# Patient Record
Sex: MALE
[De-identification: names, ages, dates, MRNs, and addresses within clinical notes are randomized per-mention and may not be internally consistent; named-entity substitution may affect disease eponyms.]

---

## 2017-04-06 ENCOUNTER — APPOINTMENT (OUTPATIENT)
Dept: ORTHOPEDIC SURGERY | Facility: CLINIC | Age: 42
End: 2017-04-06

## 2019-08-22 ENCOUNTER — NON-APPOINTMENT (OUTPATIENT)
Age: 44
End: 2019-08-22

## 2019-08-22 ENCOUNTER — APPOINTMENT (OUTPATIENT)
Dept: OPHTHALMOLOGY | Facility: CLINIC | Age: 44
End: 2019-08-22
Payer: COMMERCIAL

## 2019-08-22 PROCEDURE — 92004 COMPRE OPH EXAM NEW PT 1/>: CPT

## 2019-08-22 PROCEDURE — 92015 DETERMINE REFRACTIVE STATE: CPT

## 2020-12-02 ENCOUNTER — TRANSCRIPTION ENCOUNTER (OUTPATIENT)
Age: 45
End: 2020-12-02

## 2025-02-27 ENCOUNTER — TRANSCRIPTION ENCOUNTER (OUTPATIENT)
Age: 50
End: 2025-02-27

## 2025-02-27 ENCOUNTER — INPATIENT (INPATIENT)
Facility: HOSPITAL | Age: 50
LOS: 0 days | Discharge: HOME | DRG: 313 | End: 2025-02-28
Attending: INTERNAL MEDICINE | Admitting: STUDENT IN AN ORGANIZED HEALTH CARE EDUCATION/TRAINING PROGRAM
Payer: COMMERCIAL

## 2025-02-27 ENCOUNTER — RESULT REVIEW (OUTPATIENT)
Age: 50
End: 2025-02-27

## 2025-02-27 VITALS
HEIGHT: 64 IN | SYSTOLIC BLOOD PRESSURE: 150 MMHG | TEMPERATURE: 98 F | WEIGHT: 151.02 LBS | OXYGEN SATURATION: 100 % | HEART RATE: 78 BPM | RESPIRATION RATE: 18 BRPM | DIASTOLIC BLOOD PRESSURE: 87 MMHG

## 2025-02-27 DIAGNOSIS — E11.9 TYPE 2 DIABETES MELLITUS WITHOUT COMPLICATIONS: ICD-10-CM

## 2025-02-27 DIAGNOSIS — K21.9 GASTRO-ESOPHAGEAL REFLUX DISEASE WITHOUT ESOPHAGITIS: ICD-10-CM

## 2025-02-27 DIAGNOSIS — Z29.9 ENCOUNTER FOR PROPHYLACTIC MEASURES, UNSPECIFIED: ICD-10-CM

## 2025-02-27 DIAGNOSIS — I10 ESSENTIAL (PRIMARY) HYPERTENSION: ICD-10-CM

## 2025-02-27 DIAGNOSIS — E78.5 HYPERLIPIDEMIA, UNSPECIFIED: ICD-10-CM

## 2025-02-27 DIAGNOSIS — R07.9 CHEST PAIN, UNSPECIFIED: ICD-10-CM

## 2025-02-27 LAB
A1C WITH ESTIMATED AVERAGE GLUCOSE RESULT: 8 % — HIGH (ref 4–5.6)
ADD ON TEST-SPECIMEN IN LAB: SIGNIFICANT CHANGE UP
ALBUMIN SERPL ELPH-MCNC: 4.5 G/DL — SIGNIFICANT CHANGE UP (ref 3.3–5)
ALP SERPL-CCNC: 81 U/L — SIGNIFICANT CHANGE UP (ref 40–120)
ALT FLD-CCNC: 37 U/L — SIGNIFICANT CHANGE UP (ref 10–45)
ANION GAP SERPL CALC-SCNC: 15 MMOL/L — SIGNIFICANT CHANGE UP (ref 5–17)
AST SERPL-CCNC: 29 U/L — SIGNIFICANT CHANGE UP (ref 10–40)
BASOPHILS # BLD AUTO: 0.09 K/UL — SIGNIFICANT CHANGE UP (ref 0–0.2)
BASOPHILS NFR BLD AUTO: 0.8 % — SIGNIFICANT CHANGE UP (ref 0–2)
BILIRUB SERPL-MCNC: 0.5 MG/DL — SIGNIFICANT CHANGE UP (ref 0.2–1.2)
BUN SERPL-MCNC: 24 MG/DL — HIGH (ref 7–23)
CALCIUM SERPL-MCNC: 9.6 MG/DL — SIGNIFICANT CHANGE UP (ref 8.4–10.5)
CHLORIDE SERPL-SCNC: 97 MMOL/L — SIGNIFICANT CHANGE UP (ref 96–108)
CHOLEST SERPL-MCNC: 114 MG/DL — SIGNIFICANT CHANGE UP
CO2 SERPL-SCNC: 25 MMOL/L — SIGNIFICANT CHANGE UP (ref 22–31)
CREAT SERPL-MCNC: 0.89 MG/DL — SIGNIFICANT CHANGE UP (ref 0.5–1.3)
EGFR: 103 ML/MIN/1.73M2 — SIGNIFICANT CHANGE UP
EOSINOPHIL # BLD AUTO: 2.51 K/UL — HIGH (ref 0–0.5)
EOSINOPHIL NFR BLD AUTO: 22.7 % — HIGH (ref 0–6)
ESTIMATED AVERAGE GLUCOSE: 183 MG/DL — HIGH (ref 68–114)
FLUAV AG NPH QL: SIGNIFICANT CHANGE UP
FLUBV AG NPH QL: SIGNIFICANT CHANGE UP
GLUCOSE SERPL-MCNC: 178 MG/DL — HIGH (ref 70–99)
HCT VFR BLD CALC: 46.1 % — SIGNIFICANT CHANGE UP (ref 39–50)
HDLC SERPL-MCNC: 25 MG/DL — LOW
HGB BLD-MCNC: 15.7 G/DL — SIGNIFICANT CHANGE UP (ref 13–17)
LIDOCAIN IGE QN: 25 U/L — SIGNIFICANT CHANGE UP (ref 7–60)
LIPID PNL WITH DIRECT LDL SERPL: 68 MG/DL — SIGNIFICANT CHANGE UP
LYMPHOCYTES # BLD AUTO: 1.58 K/UL — SIGNIFICANT CHANGE UP (ref 1–3.3)
LYMPHOCYTES # BLD AUTO: 14.3 % — SIGNIFICANT CHANGE UP (ref 13–44)
MAGNESIUM SERPL-MCNC: 1.9 MG/DL — SIGNIFICANT CHANGE UP (ref 1.6–2.6)
MANUAL SMEAR VERIFICATION: SIGNIFICANT CHANGE UP
MCHC RBC-ENTMCNC: 29.7 PG — SIGNIFICANT CHANGE UP (ref 27–34)
MCHC RBC-ENTMCNC: 34.1 G/DL — SIGNIFICANT CHANGE UP (ref 32–36)
MCV RBC AUTO: 87.3 FL — SIGNIFICANT CHANGE UP (ref 80–100)
MONOCYTES # BLD AUTO: 0.38 K/UL — SIGNIFICANT CHANGE UP (ref 0–0.9)
MONOCYTES NFR BLD AUTO: 3.4 % — SIGNIFICANT CHANGE UP (ref 2–14)
NEUTROPHILS # BLD AUTO: 6.5 K/UL — SIGNIFICANT CHANGE UP (ref 1.8–7.4)
NEUTROPHILS NFR BLD AUTO: 58.8 % — SIGNIFICANT CHANGE UP (ref 43–77)
NON HDL CHOLESTEROL: 89 MG/DL — SIGNIFICANT CHANGE UP
PHOSPHATE SERPL-MCNC: 3 MG/DL — SIGNIFICANT CHANGE UP (ref 2.5–4.5)
PLAT MORPH BLD: NORMAL — SIGNIFICANT CHANGE UP
PLATELET # BLD AUTO: 227 K/UL — SIGNIFICANT CHANGE UP (ref 150–400)
POTASSIUM SERPL-MCNC: 3.8 MMOL/L — SIGNIFICANT CHANGE UP (ref 3.5–5.3)
POTASSIUM SERPL-SCNC: 3.8 MMOL/L — SIGNIFICANT CHANGE UP (ref 3.5–5.3)
PROT SERPL-MCNC: 7.7 G/DL — SIGNIFICANT CHANGE UP (ref 6–8.3)
RBC # BLD: 5.28 M/UL — SIGNIFICANT CHANGE UP (ref 4.2–5.8)
RBC # FLD: 11.3 % — SIGNIFICANT CHANGE UP (ref 10.3–14.5)
RBC BLD AUTO: SIGNIFICANT CHANGE UP
RSV RNA NPH QL NAA+NON-PROBE: SIGNIFICANT CHANGE UP
SARS-COV-2 RNA SPEC QL NAA+PROBE: SIGNIFICANT CHANGE UP
SODIUM SERPL-SCNC: 137 MMOL/L — SIGNIFICANT CHANGE UP (ref 135–145)
TRIGL SERPL-MCNC: 112 MG/DL — SIGNIFICANT CHANGE UP
TROPONIN T, HIGH SENSITIVITY RESULT: 7 NG/L — SIGNIFICANT CHANGE UP (ref 0–51)
TROPONIN T, HIGH SENSITIVITY RESULT: <6 NG/L — SIGNIFICANT CHANGE UP (ref 0–51)
WBC # BLD: 11.06 K/UL — HIGH (ref 3.8–10.5)
WBC # FLD AUTO: 11.06 K/UL — HIGH (ref 3.8–10.5)

## 2025-02-27 PROCEDURE — 99223 1ST HOSP IP/OBS HIGH 75: CPT | Mod: GC

## 2025-02-27 PROCEDURE — 99285 EMERGENCY DEPT VISIT HI MDM: CPT

## 2025-02-27 PROCEDURE — 71045 X-RAY EXAM CHEST 1 VIEW: CPT | Mod: 26

## 2025-02-27 PROCEDURE — 99223 1ST HOSP IP/OBS HIGH 75: CPT

## 2025-02-27 PROCEDURE — 93308 TTE F-UP OR LMTD: CPT | Mod: 26

## 2025-02-27 PROCEDURE — 93356 MYOCRD STRAIN IMG SPCKL TRCK: CPT

## 2025-02-27 PROCEDURE — 93306 TTE W/DOPPLER COMPLETE: CPT | Mod: 26

## 2025-02-27 RX ORDER — DEXTROSE 50 % IN WATER 50 %
25 SYRINGE (ML) INTRAVENOUS ONCE
Refills: 0 | Status: DISCONTINUED | OUTPATIENT
Start: 2025-02-27 | End: 2025-02-28

## 2025-02-27 RX ORDER — ATORVASTATIN CALCIUM 80 MG/1
1 TABLET, FILM COATED ORAL
Refills: 0 | DISCHARGE

## 2025-02-27 RX ORDER — GLUCAGON 3 MG/1
1 POWDER NASAL ONCE
Refills: 0 | Status: DISCONTINUED | OUTPATIENT
Start: 2025-02-27 | End: 2025-02-28

## 2025-02-27 RX ORDER — ASPIRIN 325 MG
81 TABLET ORAL DAILY
Refills: 0 | Status: DISCONTINUED | OUTPATIENT
Start: 2025-02-28 | End: 2025-02-28

## 2025-02-27 RX ORDER — MELATONIN 5 MG
3 TABLET ORAL AT BEDTIME
Refills: 0 | Status: DISCONTINUED | OUTPATIENT
Start: 2025-02-27 | End: 2025-02-28

## 2025-02-27 RX ORDER — INSULIN LISPRO 100 U/ML
INJECTION, SOLUTION INTRAVENOUS; SUBCUTANEOUS
Refills: 0 | Status: DISCONTINUED | OUTPATIENT
Start: 2025-02-27 | End: 2025-02-28

## 2025-02-27 RX ORDER — SODIUM CHLORIDE 9 G/1000ML
1000 INJECTION, SOLUTION INTRAVENOUS
Refills: 0 | Status: DISCONTINUED | OUTPATIENT
Start: 2025-02-27 | End: 2025-02-28

## 2025-02-27 RX ORDER — DEXTROSE 50 % IN WATER 50 %
12.5 SYRINGE (ML) INTRAVENOUS ONCE
Refills: 0 | Status: DISCONTINUED | OUTPATIENT
Start: 2025-02-27 | End: 2025-02-28

## 2025-02-27 RX ORDER — METFORMIN HYDROCHLORIDE 850 MG/1
1 TABLET ORAL
Refills: 0 | DISCHARGE

## 2025-02-27 RX ORDER — DEXTROSE 50 % IN WATER 50 %
15 SYRINGE (ML) INTRAVENOUS ONCE
Refills: 0 | Status: DISCONTINUED | OUTPATIENT
Start: 2025-02-27 | End: 2025-02-28

## 2025-02-27 RX ORDER — GLIMEPIRIDE 4 MG/1
1 TABLET ORAL
Refills: 0 | DISCHARGE

## 2025-02-27 RX ORDER — ENOXAPARIN SODIUM 100 MG/ML
40 INJECTION SUBCUTANEOUS EVERY 24 HOURS
Refills: 0 | Status: DISCONTINUED | OUTPATIENT
Start: 2025-02-27 | End: 2025-02-28

## 2025-02-27 RX ORDER — INSULIN LISPRO 100 U/ML
INJECTION, SOLUTION INTRAVENOUS; SUBCUTANEOUS AT BEDTIME
Refills: 0 | Status: DISCONTINUED | OUTPATIENT
Start: 2025-02-28 | End: 2025-02-28

## 2025-02-27 RX ORDER — METFORMIN HYDROCHLORIDE 850 MG/1
2 TABLET ORAL
Refills: 0 | DISCHARGE

## 2025-02-27 RX ORDER — ASPIRIN 325 MG
324 TABLET ORAL ONCE
Refills: 0 | Status: COMPLETED | OUTPATIENT
Start: 2025-02-27 | End: 2025-02-27

## 2025-02-27 RX ORDER — ATORVASTATIN CALCIUM 80 MG/1
20 TABLET, FILM COATED ORAL AT BEDTIME
Refills: 0 | Status: DISCONTINUED | OUTPATIENT
Start: 2025-02-27 | End: 2025-02-28

## 2025-02-27 RX ADMIN — Medication 324 MILLIGRAM(S): at 11:52

## 2025-02-27 NOTE — H&P ADULT - HISTORY OF PRESENT ILLNESS
49 y/o M with PMH HTN, HLD, T2DM presents with intermittent substernal chest pain for the past two weeks. The pain initially began after a recent upper respiratory illness while playing badminton and has since become more frequent, occurring both with exertion and at rest. Patient states since his recent URI he has been feeling more generalized weakness and had to take multiple days off work for this. Although previously able to play badminton for 2 hours, now can only play 10 minutes before felling shortness of breath and chest pain. The chest pain is described as a 3/10 discomfort with occasional radiation to the sides, back, and sternal notch and associated with nausea and diaphoresis. Symptoms are worsened by cold weather and eating but improve with rest. Upon interview, patient currently experiencing 1.5/10 chest pain and states he feels comfortable. He does endorse 10 pound weight gain in the past 2 years, and  worsening of his T2DM for which his PCP has increased his metformin and glimeride dosage this week. He denies fevers, chills, shortness of breath, palpitations, dizziness, orthopnea, PND, syncope, changes in bowel habits, dysuria, lower extremity edema, recent travel, recent immobilization.    In the ED, the patient was hemodynamically stable with normal vital signs.  Initial workup included an EKG showing normal sinus rhythm with T-wave inversions in lead III but no other ischemic changes. CXR was clear, and labs were notable for a mildly elevated WBC count 11.06. He was given aspirin 324 mg. Echocardiogram demonstrated normal left and right ventricular function with no significant valvular disease or pericardial effusion.   49 y/o M with PMH HTN, HLD, T2DM presents with intermittent substernal chest pain for the past two weeks. The pain initially began after a recent upper respiratory illness while playing badminton and has since become more frequent, occurring both with exertion and at rest. Patient states since his recent URI he has been feeling more generalized weakness and had to take multiple days off work for this. Although previously able to play badminton for 2 hours, now can only play 10 minutes before felling shortness of breath and chest pain. The chest pain is described as a 3/10 discomfort with occasional radiation to the sides, back, and sternal notch and associated with nausea and diaphoresis. Symptoms are worsened by cold weather and eating but improve with rest. Upon interview, patient currently experiencing 1.5/10 chest pain and states he feels comfortable. He does endorse 10 pound weight gain in the past 2 years, and  worsening of his T2DM for which his PCP has increased his metformin and glimepiride dosage this week. He denies fevers, chills, shortness of breath, palpitations, dizziness, orthopnea, PND, syncope, changes in bowel habits, dysuria, lower extremity edema, recent travel, recent immobilization.    In the ED, the patient was hemodynamically stable with normal vital signs.  Initial workup included an EKG showing normal sinus rhythm with T-wave inversions in lead III but no other ischemic changes. CXR was clear, and labs were notable for a mildly elevated WBC count 11.06. He was given aspirin 324 mg. Echocardiogram demonstrated normal left and right ventricular function with no significant valvular disease or pericardial effusion.

## 2025-02-27 NOTE — ED PROVIDER NOTE - CLINICAL SUMMARY MEDICAL DECISION MAKING FREE TEXT BOX
RGUJRAL 51yo male hx DM, HTN, HLD presents with substernal chest pain x 2 weeks. Pt describes intermittent episodes worse with exertion, at times with eating and now even at rest. Pain is associated with diaphoresis and improved with rest. States he has been noticing increased fatigue with exertion. Denies any sob, fever, chills. Previous stress test 4-5 years ago, + Family hx. Patient is a non smoker. On exam, Patient is awake,alert,oriented x 3. Patient is well appearing and in no acute distress. Patient's chest is clear to ausculation, +s1s2. Abdomen is soft nd/nt +BS. Extremity with no swelling or calf tenderness. EKG reviewed and stable, no active chest pain at this time. Check labs, Xray chest to eval for ACS. Monitor and re eval. RGUJRAL 49yo male hx DM, HTN, HLD presents with substernal chest pain x 2 weeks. Pt describes intermittent episodes worse with exertion, at times with eating and now even at rest. Pain is associated with diaphoresis and improved with rest. States he has been noticing increased fatigue with exertion. Denies any sob, fever, chills. Previous stress test 4-5 years ago, + Family hx. Patient is a non smoker. On exam, Patient is awake,alert,oriented x 3. Patient is well appearing and in no acute distress. Patient's chest is clear to ausculation, +s1s2. Abdomen is soft nd/nt +BS. Extremity with no swelling or calf tenderness. EKG reviewed and stable, no active chest pain at this time. Check labs, Xray chest to eval for ACS. Monitor and re eval.    HPI:  49 y/o M w/ PMHx of HTN, HLD, DM who presents with intermittent sub-sternal chest pain over the past 2 weeks. Pain relieved with rest however intermittently occurs at rest too. Also intermittently associated with PO intake, diaphoresis and decreased exercise tolerance (feels winded when playing badminton which he usually does). Otherwise, chest pain seems to radiate to abdomen as well as LUE and RUE but not to the back. No recent immobilizations or long flights. No calf pain, or swelling. URI symptoms about 2 weeks ago prior to symptom onset. Denies any nausea, vomiting, diarrhea, dysuria, hematuria, blood in stool.    ROS:  As stated above.    FHx/SHx:   Non-contributory.    PE:   Vital signs reviewed.  GENERAL: No acute distress, non toxic-appearing  HEAD: Atraumatic, normocephalic  EARS: Externally normal, atraumatic  EYES: No jaundice, not injected, no rupture, no foreign bodies  MOUTH: Moist mucous membranes  NECK: No swelling, no lymphadenopathy  HEART: Regular rate and rhythm, normal S1/S2, no murmurs, no rubs, no gallops  LUNGS: Clear to auscultation bilaterally without rhonchi, rales, or wheezing  ABDOMEN: Soft, non-distended, and non tender in all 4 quadrants  EXTREMITIES: No gross deformities  VASCULAR: Pulses palpable in all extremities, no pitting edema, capillary refill <2 secs  SKIN: Grossly intact without rash or open wounds  PSYCH: Alert and oriented x 3  NEURO: Strength 5/5 and sensation intact in all 4 extremities.     A/P:   49 y/o M w/ PMHx as above who presents with chest pain. Vital signs stable. Exam within normal limits no abdominal tenderness. Patient comfortable, speaking in full sentences. Concern for ACS (unstable angina vs NSTEMI given EKG with no ST elevations or depressions) vs less likely PE, aortic dissection, pneumothorax. Possible pericarditis vs myocarditis. HEART score of 4.  - labs  - troponin  - aspirin 324  - ED echocardiogram and abdominal US  - likely CDU for stress and official TTE

## 2025-02-27 NOTE — H&P ADULT - PROBLEM SELECTOR PLAN 4
- HbA1c 8.0%, on Glimeperide 2mg qd, Metformin 1000mg in AM, 500mg PM  PLAN:  - Hold home meds  - START VAN - HbA1c 8.0%, on Glimeperide 2mg qd, Metformin 1000mg in AM, 500mg PM    PLAN:  - Hold home meds  - START VAN

## 2025-02-27 NOTE — H&P ADULT - NSHPLABSRESULTS_GEN_ALL_CORE
.  LABS:                         15.7   11.06 )-----------( 227      ( 27 Feb 2025 11:43 )             46.1     02-27    137  |  97  |  24[H]  ----------------------------<  178[H]  3.8   |  25  |  0.89    Ca    9.6      27 Feb 2025 11:43  Phos  3.0     02-27  Mg     1.9     02-27    TPro  7.7  /  Alb  4.5  /  TBili  0.5  /  DBili  x   /  AST  29  /  ALT  37  /  AlkPhos  81  02-27      Urinalysis Basic - ( 27 Feb 2025 11:43 )    Color: x / Appearance: x / SG: x / pH: x  Gluc: 178 mg/dL / Ketone: x  / Bili: x / Urobili: x   Blood: x / Protein: x / Nitrite: x   Leuk Esterase: x / RBC: x / WBC x   Sq Epi: x / Non Sq Epi: x / Bacteria: x    Chest Xray:  FINDINGS:  The heart is normal in size.  The lungs are clear.  There is no pneumothorax or pleural effusion.  No acute osseous abnormalities      IMPRESSION:  Clear lungs.    TTE:     CONCLUSIONS:      1. Left ventricular systolic function is normal with an ejection fraction visually estimated at 65 to 70 %. There are no regional wall motion abnormalities seen.   2. Normal left ventricular diastolic function.   3. Normal right ventricular cavity size and normal right ventricularsystolic function.   4. No significant valvular disease.   5. No pericardial effusion seen.

## 2025-02-27 NOTE — H&P ADULT - PROBLEM SELECTOR PLAN 6
DVT PPx: Lovenox 40mg  E: replete K<4, Mg<2  GI PPx: protonix 40mg  Diet: DASH  PT/OT: n/a  Code Status: Full  Dispo: home

## 2025-02-27 NOTE — ED ADULT NURSE NOTE - OBJECTIVE STATEMENT
50y male presents with chest pain. Pt states he has been feeling chest pain on and off for two weeks, with the worst episodes of chest pain happening last night and this morning. Pt states this morning he felt the pain travel from his chest down his right arm and felt sweaty and dizzy during this episode lasting about 10 minutes. Pt denies fever, chills, shortness of breath, nausea, vomiting, diarrhea.

## 2025-02-27 NOTE — H&P ADULT - NSHPREVIEWOFSYSTEMS_GEN_ALL_CORE
REVIEW OF SYSTEMS:    CONSTITUTIONAL:  +weakness, fevers, or chills  EYES/ENT:  No visual changes, vertigo, or throat pain   NECK:  No pain or stiffness  RESPIRATORY:  +SOB, cough, wheezing, or hemoptysis  CARDIOVASCULAR:  +chest pain or palpitations  GASTROINTESTINAL:  No abdominal pain, nausea, vomiting, or hematemesis; No diarrhea or constipation; No melena or hematochezia.  GENITOURINARY:  No dysuria, change in frequency, or hematuria  NEUROLOGICAL:  No numbness or weakness  SKIN:  No itching or rashes

## 2025-02-27 NOTE — H&P ADULT - ATTENDING COMMENTS
50 M with PMH HTN, HLD, T2DM  p/w CP + DUMONT x few weeks , progressing , CSS , exam unremarkable,  lab w/ wbc 11 , troponin neg x 2 , ekg nsr TWI in III ,  echo wnl ; symptoms c/w unstable angina ,  s/p asa load , since currenlt asymptomatic can hold off on ACS protocol , will monitor on telemetry , continue daily asa , evaluated by cardiology recommends stress testing , will order , likely will require LHC.

## 2025-02-27 NOTE — CONSULT NOTE ADULT - SUBJECTIVE AND OBJECTIVE BOX
HPI:  50 year old male patient with HTN, HLD, NIDDM presented to the ED for chest pain over the past 2 weeks.     Patient states that he had a cold 2 weeks ago and while playing badminton with his friends he experiencing substernal chest pain and felt tired. He did not have shortness of breath at the time and the pain subsided when he rested. Since then he states he has been experiencing a similar pain all over his chest, 3/10, with occasional radiation to sides, back and sternal notch especially when he is walking to the train station in the morning. He notes that his pain is worse in the cold weather and aggravated by taking deep breaths. Her endorses GERD symptoms as well but unclear if symptoms are related. He denies SOB, palpitations, syncope, dizziness.    He states that he had a similar presentation 5 years ago and had a stress test done and was told it was "ok". He quit smoking 10 years ago and had a pack year index of 2-5. His brother recently had PCI with 4 stents placed. He has family history of HTN and DM in his father, brother, and sister.    PAST MEDICAL & SURGICAL HISTORY:  HTN (hypertension)  HLD (hyperlipidemia)  DM (diabetes mellitus)    FAMILY HISTORY: relevant information in HPI    SOCIAL HISTORY:  relevant information in HPI    MEDICATIONS:  - Valsartan/HCTZ 160/25mg (patient unsure of dose)  - Metformin 1000mg BID  - Atorvastatin (patient unsure of dose)    -------------------------------------------------------------------------------------------  PHYSICAL EXAM:  T(C): 36.8 (02-27-25 @ 10:25), Max: 36.8 (02-27-25 @ 10:25)  HR: 78 (02-27-25 @ 10:25) (78 - 78)  BP: 150/87 (02-27-25 @ 10:25) (150/87 - 150/87)  RR: 18 (02-27-25 @ 10:25) (18 - 18)  SpO2: 100% (02-27-25 @ 10:25) (100% - 100%)    GENERAL: no acute distress, AAOx3  NECK: No JVD  CHEST/LUNG: clear to auscultation bilaterally, unlabored breathing  HEART: regular rate and rhythm, no murmurs or gallops.  ABDOMEN: soft, non-tender, non-distended, bowel sounds present  EXTREMITIES:  warm, no LE edema, 2+ DP pulses    -------------------------------------------------------------------------------------------  RELEVANT LABS:    Trop <6  Cr 0.89  Glucose 178  WBC 11      -------------------------------------------------------------------------------------------  RELEVANT IMAGING:     ECG:     Echo:     Stress Testing:    Cath:    -------------------------------------------------------------------------------------------                 HPI:  50 year old male patient with HTN, HLD, NIDDM presented to the ED for chest pain over the past 2 weeks.     Patient states that he had a cold 2 weeks ago and while playing badminton with his friends he experiencing substernal chest pain and felt tired. He did not have shortness of breath at the time and the pain subsided when he rested. Since then he states he has been experiencing a similar pain all over his chest, 3/10, with occasional radiation to sides, back and sternal notch especially when he is walking to the train station in the morning. He notes that his pain is worse in the cold weather and aggravated by taking deep breaths. Her endorses GERD symptoms as well but unclear if symptoms are related. He denies SOB, palpitations, syncope, dizziness.    He states that he had a similar presentation 5 years ago and had a stress test done and was told it was "ok". He quit smoking 10 years ago and had a pack year index of 2-5. His brother recently had PCI with 4 stents placed. He has family history of HTN and DM in his father, brother, and sister.    PAST MEDICAL & SURGICAL HISTORY:  HTN (hypertension)  HLD (hyperlipidemia)  DM (diabetes mellitus)    FAMILY HISTORY: relevant information in HPI    SOCIAL HISTORY:  relevant information in HPI    MEDICATIONS:  - Valsartan/HCTZ ?160/25mg (patient unsure of dose)  - Metformin 1000mg BID  - Atorvastatin (patient unsure of dose)    -------------------------------------------------------------------------------------------  PHYSICAL EXAM:  T(C): 36.8 (02-27-25 @ 10:25), Max: 36.8 (02-27-25 @ 10:25)  HR: 78 (02-27-25 @ 10:25) (78 - 78)  BP: 150/87 (02-27-25 @ 10:25) (150/87 - 150/87)  RR: 18 (02-27-25 @ 10:25) (18 - 18)  SpO2: 100% (02-27-25 @ 10:25) (100% - 100%)    GENERAL: no acute distress, AAOx3  NECK: No JVD  CHEST/LUNG: clear to auscultation bilaterally, unlabored breathing  HEART: regular rate and rhythm, no murmurs or gallops.  ABDOMEN: soft, non-tender, non-distended, bowel sounds present  EXTREMITIES:  warm, no LE edema, 2+ DP pulses    -------------------------------------------------------------------------------------------  RELEVANT LABS:    Trop <6  Cr 0.89  Glucose 178  WBC 11      -------------------------------------------------------------------------------------------  RELEVANT IMAGING:     ECG: NSR 71bpm, TWI lead III, otherwise no other ST-T ischemic changes    Echo: none on file    Stress Testing: none on file    Cath: none on file    -------------------------------------------------------------------------------------------

## 2025-02-27 NOTE — H&P ADULT - TIME BILLING
Chart review , case discussion with other  providers , obtain  history  ,  examination of patient , answering questions and concerns , review orders labs and medications , and documentation

## 2025-02-27 NOTE — CONSULT NOTE ADULT - ASSESSMENT
50 year old male patient with HTN, HLD, NIDDM presented to the ED for chest pain over the past 2 weeks.     - Chest pain    Chest pain unlikely cardiac and likely 2/2 pleuritis in the setting of URI vs GERD; however, given patient has elevated risk factors for CAD, patient may benefit from inpatient stress testing.     RECOMMENDATIONS:  - Please obtain repeat troponin in 1-3 hours to r/o ACS  - Please obtain A1c and lipid profile  - Please obtain TTE   - Please obtain exercise treadmill stress test with NM-SPECT imaging  - Patient can be monitored in CDU    Note written by visiting resident Antonella Jonas, PGY3.    50 year old male patient with HTN, HLD, NIDDM presented to the ED for chest pain over the past 2 weeks.     - Chest pain    Chest pain unlikely cardiac and likely 2/2 pleuritis in the setting of URI vs GERD; however, given patient has elevated risk factors for CAD, patient may benefit from inpatient stress testing.     RECOMMENDATIONS:  - Please obtain repeat troponin in 1-3 hours to r/o ACS  - Please obtain A1c and lipid profile  - Patient can be monitored in CDU  - will plan for an assessment of patient's exercise capacity and heart function with a treadmill exercise stress test with likely echo imaging (tentatively tomorrow)      Note written by visiting resident Antonella Jonas, PGY3.

## 2025-02-27 NOTE — H&P ADULT - ASSESSMENT
51 y/o M with PMH HTN, HLD, T2DM p/w intermittent substernal chest pain for the past two weeks c/f unstable angina.

## 2025-02-27 NOTE — H&P ADULT - PROBLEM SELECTOR PLAN 1
- Pt presenting with midsternal chest pain on exertion and at rest for 2 weeks after URI  - EKG with TWI in lead 3, otherwise no evidence of ischemia, troponin negative x2, CXR wnl, TTE normal EF, no RWMA  - Current presentation concerning for unstable angina; HEART score: 4, LYUBOV score 1, placing patient at low/moderate risk  - risk factors: HTN, HLD, T2DM, positive FH for MI in brother    PLAN:  - Cardiology c/s, will plan for an assessment of patient's exercise capacity and heart function with a treadmill exercise stress test with likely echo imaging  - Sublingual nitroglycerin 0.4mg for pain  - Monitor on tele - Pt presenting with midsternal chest pain on exertion and at rest for 2 weeks after URI  - EKG with TWI in lead 3, otherwise no evidence of ischemia, troponin negative x2, CXR wnl, TTE normal EF, no RWMA  - Current presentation concerning for unstable angina; HEART score: 4, LYUBOV score 1, placing patient at low/moderate risk  - Risk factors: HTN, HLD, T2DM, positive FH for MI in brother  - s/p ASA 324mg in the ED    PLAN:  - Cardiology c/s, will plan for an assessment of patient's exercise capacity and heart function with a treadmill exercise stress test with likely echo imaging  - Sublingual nitroglycerin 0.4mg for pain  - Start ASA 81mg  - Monitor on tele

## 2025-02-27 NOTE — H&P ADULT - NSHPSOCIALHISTORY_GEN_ALL_CORE
Denies current smoking. Previously smoked 4-5 cigarettes per week for 10 years, quit 10 years ago. Denies alcohol, or drugs. Lives with his wife, works as an

## 2025-02-27 NOTE — ED PROVIDER NOTE - PROGRESS NOTE DETAILS
Jasiel Caal (EM/IM PGY-1). Patient with active chest pain 1/10 in intensity. Troponin x2 negative. Cardiology consulted recommend admission to CDU however CDU unable to take patient with active chest pain. Will admit to medicine for chest pain workup.

## 2025-02-27 NOTE — H&P ADULT - NSICDXFAMILYHX_GEN_ALL_CORE_FT
FAMILY HISTORY:  Father  Still living? Unknown  FH: HTN (hypertension), Age at diagnosis: Age Unknown  FH: hyperlipidemia, Age at diagnosis: Age Unknown    Sibling  Still living? Unknown  FH: myocardial infarction, Age at diagnosis: Age Unknown

## 2025-02-28 ENCOUNTER — TRANSCRIPTION ENCOUNTER (OUTPATIENT)
Age: 50
End: 2025-02-28

## 2025-02-28 ENCOUNTER — RESULT REVIEW (OUTPATIENT)
Age: 50
End: 2025-02-28

## 2025-02-28 VITALS
TEMPERATURE: 99 F | RESPIRATION RATE: 18 BRPM | DIASTOLIC BLOOD PRESSURE: 74 MMHG | HEART RATE: 78 BPM | SYSTOLIC BLOOD PRESSURE: 142 MMHG | OXYGEN SATURATION: 96 %

## 2025-02-28 LAB
GLUCOSE BLDC GLUCOMTR-MCNC: 148 MG/DL — HIGH (ref 70–99)
GLUCOSE BLDC GLUCOMTR-MCNC: 163 MG/DL — HIGH (ref 70–99)
HCT VFR BLD CALC: 41.5 % — SIGNIFICANT CHANGE UP (ref 39–50)
HGB BLD-MCNC: 14 G/DL — SIGNIFICANT CHANGE UP (ref 13–17)
MCHC RBC-ENTMCNC: 28.8 PG — SIGNIFICANT CHANGE UP (ref 27–34)
MCHC RBC-ENTMCNC: 33.7 G/DL — SIGNIFICANT CHANGE UP (ref 32–36)
MCV RBC AUTO: 85.4 FL — SIGNIFICANT CHANGE UP (ref 80–100)
NRBC BLD AUTO-RTO: 0 /100 WBCS — SIGNIFICANT CHANGE UP (ref 0–0)
PLATELET # BLD AUTO: 206 K/UL — SIGNIFICANT CHANGE UP (ref 150–400)
RBC # BLD: 4.86 M/UL — SIGNIFICANT CHANGE UP (ref 4.2–5.8)
RBC # FLD: 11.3 % — SIGNIFICANT CHANGE UP (ref 10.3–14.5)
WBC # BLD: 9.63 K/UL — SIGNIFICANT CHANGE UP (ref 3.8–10.5)
WBC # FLD AUTO: 9.63 K/UL — SIGNIFICANT CHANGE UP (ref 3.8–10.5)

## 2025-02-28 PROCEDURE — 82962 GLUCOSE BLOOD TEST: CPT

## 2025-02-28 PROCEDURE — 82553 CREATINE MB FRACTION: CPT

## 2025-02-28 PROCEDURE — 93356 MYOCRD STRAIN IMG SPCKL TRCK: CPT

## 2025-02-28 PROCEDURE — 80053 COMPREHEN METABOLIC PANEL: CPT

## 2025-02-28 PROCEDURE — 93351 STRESS TTE COMPLETE: CPT | Mod: 26

## 2025-02-28 PROCEDURE — 85025 COMPLETE CBC W/AUTO DIFF WBC: CPT

## 2025-02-28 PROCEDURE — 93306 TTE W/DOPPLER COMPLETE: CPT

## 2025-02-28 PROCEDURE — 87637 SARSCOV2&INF A&B&RSV AMP PRB: CPT

## 2025-02-28 PROCEDURE — 99285 EMERGENCY DEPT VISIT HI MDM: CPT

## 2025-02-28 PROCEDURE — 85027 COMPLETE CBC AUTOMATED: CPT

## 2025-02-28 PROCEDURE — 80061 LIPID PANEL: CPT

## 2025-02-28 PROCEDURE — 83036 HEMOGLOBIN GLYCOSYLATED A1C: CPT

## 2025-02-28 PROCEDURE — 84100 ASSAY OF PHOSPHORUS: CPT

## 2025-02-28 PROCEDURE — 99239 HOSP IP/OBS DSCHRG MGMT >30: CPT | Mod: GC

## 2025-02-28 PROCEDURE — 36415 COLL VENOUS BLD VENIPUNCTURE: CPT

## 2025-02-28 PROCEDURE — 83735 ASSAY OF MAGNESIUM: CPT

## 2025-02-28 PROCEDURE — 82550 ASSAY OF CK (CPK): CPT

## 2025-02-28 PROCEDURE — 83690 ASSAY OF LIPASE: CPT

## 2025-02-28 PROCEDURE — 93308 TTE F-UP OR LMTD: CPT

## 2025-02-28 PROCEDURE — 71045 X-RAY EXAM CHEST 1 VIEW: CPT

## 2025-02-28 PROCEDURE — 84484 ASSAY OF TROPONIN QUANT: CPT

## 2025-02-28 PROCEDURE — C8930: CPT

## 2025-02-28 PROCEDURE — 99233 SBSQ HOSP IP/OBS HIGH 50: CPT

## 2025-02-28 RX ORDER — ASPIRIN 325 MG
1 TABLET ORAL
Qty: 30 | Refills: 0
Start: 2025-02-28 | End: 2025-03-29

## 2025-02-28 RX ADMIN — Medication 160 MILLIGRAM(S): at 09:00

## 2025-02-28 RX ADMIN — Medication 81 MILLIGRAM(S): at 09:01

## 2025-02-28 NOTE — DISCHARGE NOTE PROVIDER - NSDCCPCAREPLAN_GEN_ALL_CORE_FT
PRINCIPAL DISCHARGE DIAGNOSIS  Diagnosis: Chest pain  Assessment and Plan of Treatment: You presented with chest pain and shortness of breath on exertion. You were evaluated by cardiology with a negative cardiac work-up. The pain may be in the setting of inflammation from your recent respiratory viral illness. Please continue to take aspirin 81mg daily. Please follow-up with your PCP.  .  If you develop worsening chest pain or shortness of breath, please promptly seek medical attention.

## 2025-02-28 NOTE — DISCHARGE NOTE PROVIDER - NSDCFUADDAPPT_GEN_ALL_CORE_FT
APPTS ARE READY TO BE MADE: [ ] YES    Best Family or Patient Contact (if needed):    Additional Information about above appointments (if needed):    1: PCP  2: Cardiologist  3:     Other comments or requests:    APPTS ARE READY TO BE MADE: [x] YES    Best Family or Patient Contact (if needed):    Additional Information about above appointments (if needed):    1: PCP within 2 weeks  2:   3:     Other comments or requests:    APPTS ARE READY TO BE MADE: [x] YES    Best Family or Patient Contact (if needed):    Additional Information about above appointments (if needed):    1: PCP within 2 weeks  2:   3:     Other comments or requests:   Patient refused to provide their date-of-birth; unable to proceed with referral information.

## 2025-02-28 NOTE — DISCHARGE NOTE PROVIDER - HOSPITAL COURSE
HPI:  49 y/o M with PMH HTN, HLD, T2DM presents with intermittent substernal chest pain for the past two weeks. The pain initially began after a recent upper respiratory illness while playing badminton and has since become more frequent, occurring both with exertion and at rest. Patient states since his recent URI he has been feeling more generalized weakness and had to take multiple days off work for this. Although previously able to play badminton for 2 hours, now can only play 10 minutes before felling shortness of breath and chest pain. The chest pain is described as a 3/10 discomfort with occasional radiation to the sides, back, and sternal notch and associated with nausea and diaphoresis. Symptoms are worsened by cold weather and eating but improve with rest. Upon interview, patient currently experiencing 1.5/10 chest pain and states he feels comfortable. He does endorse 10 pound weight gain in the past 2 years, and  worsening of his T2DM for which his PCP has increased his metformin and glimepiride dosage this week. He denies fevers, chills, shortness of breath, palpitations, dizziness, orthopnea, PND, syncope, changes in bowel habits, dysuria, lower extremity edema, recent travel, recent immobilization.    In the ED, the patient was hemodynamically stable with normal vital signs.  Initial workup included an EKG showing normal sinus rhythm with T-wave inversions in lead III but no other ischemic changes. CXR was clear, and labs were notable for a mildly elevated WBC count 11.06. He was given aspirin 324 mg. Echocardiogram demonstrated normal left and right ventricular function with no significant valvular disease or pericardial effusion.    Hospital Course:    Important Medication Changes and Reason:  Started:  Stopped:  Continued:     Active or Pending Issues Requiring Follow-up:    Advanced Directives:   [ ] Full code  [ ] DNR  [ ] Hospice    Discharge Diagnoses:  Chest pain   HTN  HLD  T2DM       HPI:  49 y/o M with PMH HTN, HLD, T2DM presents with intermittent substernal chest pain for the past two weeks. The pain initially began after a recent upper respiratory illness while playing badminton and has since become more frequent, occurring both with exertion and at rest. Patient states since his recent URI he has been feeling more generalized weakness and had to take multiple days off work for this. Although previously able to play badminton for 2 hours, now can only play 10 minutes before felling shortness of breath and chest pain. The chest pain is described as a 3/10 discomfort with occasional radiation to the sides, back, and sternal notch and associated with nausea and diaphoresis. Symptoms are worsened by cold weather and eating but improve with rest. Upon interview, patient currently experiencing 1.5/10 chest pain and states he feels comfortable. He does endorse 10 pound weight gain in the past 2 years, and  worsening of his T2DM for which his PCP has increased his metformin and glimepiride dosage this week. He denies fevers, chills, shortness of breath, palpitations, dizziness, orthopnea, PND, syncope, changes in bowel habits, dysuria, lower extremity edema, recent travel, recent immobilization.    In the ED, the patient was hemodynamically stable with normal vital signs.  Initial workup included an EKG showing normal sinus rhythm with T-wave inversions in lead III but no other ischemic changes. CXR was clear, and labs were notable for a mildly elevated WBC count 11.06. He was given aspirin 324 mg. Echocardiogram demonstrated normal left and right ventricular function with no significant valvular disease or pericardial effusion.    Hospital Course:  49yo male with hx of HTN, HLD, and T2DM who presented with chest pain and dyspnea on exertion for past 2 weeks. Trop neg. CXR nl. EKG with TWI in Lead 3, but otherwise no acute concerns. TTE with EF 65-70% with no regional wall abnormalities. Exercise stress test without chest pain and signs of ischemia. Evaluated by cardiology. Started on [id number] qd. On day of discharge, symptoms resolved and vitals stable.      Important Medication Changes and Reason:  Started: ASA81 qd    Active or Pending Issues Requiring Follow-up:  - Follow-up with PCP    Discharge Diagnoses:  Chest pain

## 2025-02-28 NOTE — DISCHARGE NOTE NURSING/CASE MANAGEMENT/SOCIAL WORK - NSDCFUADDAPPT_GEN_ALL_CORE_FT
APPTS ARE READY TO BE MADE: [x] YES    Best Family or Patient Contact (if needed):    Additional Information about above appointments (if needed):    1: PCP within 2 weeks  2:   3:     Other comments or requests:

## 2025-02-28 NOTE — PROGRESS NOTE ADULT - ATTENDING COMMENTS
Patient seen and examined   labs and vitals are reviewed   NO chest pain this AM   feels better   exam - clear lung                         14.0   9.63  )-----------( 206      ( 28 Feb 2025 12:19 )    Stress test / Echo- no wall motion abnormality                50 M with PMH HTN, HLD, T2DM  p/w CP + DUMONT x few weeks , progressing , Troponin neg x 2,  echo wnl   Stress negative   as per card DC home   T2DM- HbA1C continue home med  HTN continue home med   DC planning with follow up with PMD

## 2025-02-28 NOTE — PROGRESS NOTE ADULT - PROBLEM SELECTOR PLAN 1
- Pt presenting with midsternal chest pain on exertion and at rest for 2 weeks after URI  - EKG with TWI in lead 3, otherwise no evidence of ischemia, troponin negative x2, CXR wnl, TTE normal EF, no RWMA  - Current presentation concerning for unstable angina; HEART score: 4, LYUBOV score 1, placing patient at low/moderate risk  - Risk factors: HTN, HLD, T2DM, positive FH for MI in brother  - s/p ASA 324mg in the ED    PLAN:  - Cardiology c/s, will plan for an assessment of patient's exercise capacity and heart function with a treadmill exercise stress test with likely echo imaging  - Sublingual nitroglycerin 0.4mg for pain  - Start ASA 81mg  - Monitor on tele - Pt presenting with midsternal chest pain on exertion and at rest for 2 weeks i/s/o URI suspicious for pleuritis vs ACS vs GERD. Less likely ACS at this time given EKG with TWI in lead 3, otherwise no evidence of ischemia, troponin negative x2, CXR wnl, TTE normal EF, no RWMA    PLAN:  - Cardiology c/s, will plan for an assessment of patient's exercise capacity and heart function with a treadmill exercise stress test with likely echo imaging  - ASA 81mg  - Monitor on tele - Pt presenting with midsternal chest pain on exertion and at rest for 2 weeks i/s/o URI suspicious for pleuritis vs ACS vs GERD. Less likely ACS at this time given EKG with TWI in lead 3, otherwise no evidence of ischemia, troponin negative x2, CXR wnl, TTE normal EF, no RWMA    PLAN:  - Cardiology c/s, will plan for an assessment of patient's exercise capacity and heart function with a treadmill exercise stress test with likely echo imaging  - ASA 81mg  - out patient follow up with PCP   - Monitor on tele

## 2025-02-28 NOTE — PROGRESS NOTE ADULT - SUBJECTIVE AND OBJECTIVE BOX
- no symptoms  - eager to get test completed and go home    -------------------------------------------------------------------------------------------  PHYSICAL EXAM:  -120  HR 60-70    GENERAL: no acute distress, AAOx3  NECK: No JVD  CHEST/LUNG: clear to auscultation bilaterally, unlabored breathing  HEART: regular rate and rhythm, no murmurs or gallops.  ABDOMEN: soft, non-tender, non-distended, bowel sounds present  EXTREMITIES:  warm, no LE edema, 2+ DP pulses    -------------------------------------------------------------------------------------------  RELEVANT LABS:    Trop <6  -> 7  Cr 0.89  Glucose 178  WBC 11    LDL 68  A1c 8    -------------------------------------------------------------------------------------------  RELEVANT IMAGING:     ECG: NSR 71bpm, TWI lead III, otherwise no other ST-T ischemic changes    Echo:   1. Left ventricular systolic function is normal with an ejection fraction visually estimated at 65 to 70 %. There are no regional wall motion abnormalities seen.   2. Normal left ventricular diastolic function.   3. Normal right ventricular cavity size and normal right ventricular systolic function.   4. No significant valvular disease.   5. No pericardial effusion seen.    Stress Testing: none on file    Cath: none on file    -------------------------------------------------------------------------------------------

## 2025-02-28 NOTE — PROGRESS NOTE ADULT - SUBJECTIVE AND OBJECTIVE BOX
******************  Authored By: Ubaldo Sandhu MD, PGY1  MS Teams Preferred  ******************  INTERVAL HPI/OVERNIGHT EVENTS:  Pt seen and examined at bedside. No acute overnight events or complaints.    Brief Daily Plan:  -    VITAL SIGNS:  T(F): 98.7 (02-28-25 @ 12:29)  HR: 78 (02-28-25 @ 12:29)  BP: 142/74 (02-28-25 @ 12:29)  RR: 18 (02-28-25 @ 12:29)  SpO2: 96% (02-28-25 @ 12:29)  Wt(kg): --    CAPILLARY BLOOD GLUCOSE    POCT Blood Glucose.: 163 mg/dL (28 Feb 2025 11:39)    PHYSICAL EXAM:    GENERAL: NAD, lying comfortably in bed   HEAD:  Atraumatic, Normocephalic  EYES: EOMI b/l, PERRLA b/l, conjunctiva and sclera clear  NECK: Supple, No JVD, No LAD   CHEST/LUNG: Clear to auscultation bilaterally; No wheeze or rhonchi, no tenderness to palpation  HEART: Regular rate and rhythm; S1 and S2 present, No murmurs, rubs, or gallops  ABDOMEN: Soft, Nontender, Nondistended; Bowel sounds present  EXTREMITIES:  2+ Peripheral Pulses, No clubbing, cyanosis, or edema  NEURO: AAOx3, non-focal   SKIN: No rashes or lesions    MEDICATIONS  (STANDING):  aspirin  chewable 81 milliGRAM(s) Oral daily  atorvastatin 20 milliGRAM(s) Oral at bedtime  dextrose 5%. 1000 milliLiter(s) (50 mL/Hr) IV Continuous <Continuous>  dextrose 5%. 1000 milliLiter(s) (100 mL/Hr) IV Continuous <Continuous>  dextrose 50% Injectable 25 Gram(s) IV Push once  dextrose 50% Injectable 12.5 Gram(s) IV Push once  dextrose 50% Injectable 25 Gram(s) IV Push once  enoxaparin Injectable 40 milliGRAM(s) SubCutaneous every 24 hours  glucagon  Injectable 1 milliGRAM(s) IntraMuscular once  hydrochlorothiazide 12.5 milliGRAM(s) Oral daily  insulin lispro (ADMELOG) corrective regimen sliding scale   SubCutaneous three times a day before meals  insulin lispro (ADMELOG) corrective regimen sliding scale   SubCutaneous at bedtime  pantoprazole    Tablet 40 milliGRAM(s) Oral before breakfast  valsartan 160 milliGRAM(s) Oral daily    MEDICATIONS  (PRN):  dextrose Oral Gel 15 Gram(s) Oral once PRN Blood Glucose LESS THAN 70 milliGRAM(s)/deciliter  melatonin 3 milliGRAM(s) Oral at bedtime PRN Insomnia      Allergies    No Known Allergies    Intolerances        LABS:                        14.0   9.63  )-----------( 206      ( 28 Feb 2025 12:19 )             41.5     02-27    137  |  97  |  24[H]  ----------------------------<  178[H]  3.8   |  25  |  0.89    Ca    9.6      27 Feb 2025 11:43  Phos  3.0     02-27  Mg     1.9     02-27    TPro  7.7  /  Alb  4.5  /  TBili  0.5  /  DBili  x   /  AST  29  /  ALT  37  /  AlkPhos  81  02-27      Urinalysis Basic - ( 27 Feb 2025 11:43 )    Color: x / Appearance: x / SG: x / pH: x  Gluc: 178 mg/dL / Ketone: x  / Bili: x / Urobili: x   Blood: x / Protein: x / Nitrite: x   Leuk Esterase: x / RBC: x / WBC x   Sq Epi: x / Non Sq Epi: x / Bacteria: x          RADIOLOGY & ADDITIONAL TESTS:  Reviewed    ******************   ******************  Authored By: Ubaldo Sandhu MD, PGY1  MS Teams Preferred  ******************  49 y/o M with PMH HTN, HLD, T2DM p/w intermittent substernal chest pain, dyspnea on exertion, and fatigue admitted for ACS rule out/ work up.         INTERVAL HPI/OVERNIGHT EVENTS:  Pt seen and examined at bedside. No acute overnight events or complaints. Patient currently denying SOB, chest pain, abdominal pain or difficulty breathing.       -    VITAL SIGNS:  T(F): 98.7 (02-28-25 @ 12:29)  HR: 78 (02-28-25 @ 12:29)  BP: 142/74 (02-28-25 @ 12:29)  RR: 18 (02-28-25 @ 12:29)  SpO2: 96% (02-28-25 @ 12:29)  Wt(kg): --    CAPILLARY BLOOD GLUCOSE    POCT Blood Glucose.: 163 mg/dL (28 Feb 2025 11:39)    PHYSICAL EXAM:    GENERAL: NAD, lying comfortably in bed   HEAD:  Atraumatic, Normocephalic  NECK: Supple, No JVD,  CHEST/LUNG: Clear to auscultation bilaterally; No wheeze or rhonchi, no tenderness to palpation  HEART: Regular rate and rhythm; S1 and S2 present, No murmurs, rubs, or gallops  ABDOMEN: Soft, Nontender, Nondistended;   EXTREMITIES:  2+ Peripheral Pulses, No clubbing, cyanosis, or edema  NEURO: AAOx3, non-focal   SKIN: No rashes or lesions    MEDICATIONS  (STANDING):  aspirin  chewable 81 milliGRAM(s) Oral daily  atorvastatin 20 milliGRAM(s) Oral at bedtime  dextrose 5%. 1000 milliLiter(s) (50 mL/Hr) IV Continuous <Continuous>  dextrose 5%. 1000 milliLiter(s) (100 mL/Hr) IV Continuous <Continuous>  dextrose 50% Injectable 25 Gram(s) IV Push once  dextrose 50% Injectable 12.5 Gram(s) IV Push once  dextrose 50% Injectable 25 Gram(s) IV Push once  enoxaparin Injectable 40 milliGRAM(s) SubCutaneous every 24 hours  glucagon  Injectable 1 milliGRAM(s) IntraMuscular once  hydrochlorothiazide 12.5 milliGRAM(s) Oral daily  insulin lispro (ADMELOG) corrective regimen sliding scale   SubCutaneous three times a day before meals  insulin lispro (ADMELOG) corrective regimen sliding scale   SubCutaneous at bedtime  pantoprazole    Tablet 40 milliGRAM(s) Oral before breakfast  valsartan 160 milliGRAM(s) Oral daily    MEDICATIONS  (PRN):  dextrose Oral Gel 15 Gram(s) Oral once PRN Blood Glucose LESS THAN 70 milliGRAM(s)/deciliter  melatonin 3 milliGRAM(s) Oral at bedtime PRN Insomnia      Allergies    No Known Allergies    Intolerances        LABS:                        14.0   9.63  )-----------( 206      ( 28 Feb 2025 12:19 )             41.5     02-27    137  |  97  |  24[H]  ----------------------------<  178[H]  3.8   |  25  |  0.89    Ca    9.6      27 Feb 2025 11:43  Phos  3.0     02-27  Mg     1.9     02-27    TPro  7.7  /  Alb  4.5  /  TBili  0.5  /  DBili  x   /  AST  29  /  ALT  37  /  AlkPhos  81  02-27      Urinalysis Basic - ( 27 Feb 2025 11:43 )    Color: x / Appearance: x / SG: x / pH: x  Gluc: 178 mg/dL / Ketone: x  / Bili: x / Urobili: x   Blood: x / Protein: x / Nitrite: x   Leuk Esterase: x / RBC: x / WBC x   Sq Epi: x / Non Sq Epi: x / Bacteria: x          RADIOLOGY & ADDITIONAL TESTS:  Reviewed    ******************

## 2025-02-28 NOTE — DISCHARGE NOTE PROVIDER - NSDCCPTREATMENT_GEN_ALL_CORE_FT
PRINCIPAL PROCEDURE  Procedure: Exercise stress test  Findings and Treatment: CONCLUSIONS   1. Normal exercise stress echocardiogram. with normal augmentation of left ventricular systolic function.   2. No exercise induced electrocardiographic evidence of myocardial ischemia.   3. No echocardiographic evidence of exercise induced ischemia.   4. The Duke Treadmill Score is 14.00, which is consistent with low risk (=5) for future cardiac events.   5. Normal left and right ventricular cavity size, wall thickness, and systolic function.   6. Compared to the transthoracic echocardiogram performed on 2/27/2025, there have been no significant interval changes. Findings were discussed with Dr. Hoff on 2/28/2025 at 11:20am.< from: Stress TTE Exercise W or WO Ultrasound Enhancing Agent (02.28.25 @ 10:08) >      SECONDARY PROCEDURE  Procedure: Complete transthoracic echocardiography (TTE)  Findings and Treatment: CONCLUSIONS:      1. Left ventricular systolic function is normal with an ejection fraction visually estimated at 65 to 70 %. There are no regional wall motion abnormalities seen.   2. Normal left ventricular diastolic function.   3. Normal right ventricular cavity size and normal right ventricularsystolic function.   4. No significant valvular disease.   5. No pericardial effusion seen.< from: TTE W or WO Ultrasound Enhancing Agent (02.27.25 @ 15:25) >

## 2025-02-28 NOTE — DISCHARGE NOTE NURSING/CASE MANAGEMENT/SOCIAL WORK - NSDCPEFALRISK_GEN_ALL_CORE
For information on Fall & Injury Prevention, visit: [email or web address] OR  [email or web address] OR  https://www.cdc.gov/steadi/patient.html

## 2025-02-28 NOTE — DISCHARGE NOTE NURSING/CASE MANAGEMENT/SOCIAL WORK - PATIENT PORTAL LINK FT
You can access the FollowMyHealth Patient Portal offered by [facility] by registering at the following website: [email or web address]. By joining [employer]’s FollowMyHealth portal, you will also be able to view your health information using other applications (apps) compatible with our system.

## 2025-02-28 NOTE — DISCHARGE NOTE PROVIDER - NSDCMRMEDTOKEN_GEN_ALL_CORE_FT
atorvastatin 20 mg oral tablet: 1 tab(s) orally once a day (at bedtime)  glimepiride 2 mg oral tablet: 1 tab(s) orally once a day  metFORMIN 500 mg oral tablet: 2 tab(s) orally once a day (in the morning)  metFORMIN 500 mg oral tablet, extended release: 1 tab(s) orally once a day (at bedtime)  Protonix 40 mg oral delayed release tablet: 1 tab(s) orally once a day  valsartan-hydrochlorothiazide 160 mg-12.5 mg oral tablet: 1 tab(s) orally once a day   aspirin 81 mg oral capsule: 1 cap(s) orally once a day  atorvastatin 20 mg oral tablet: 1 tab(s) orally once a day (at bedtime)  glimepiride 2 mg oral tablet: 1 tab(s) orally once a day  metFORMIN 500 mg oral tablet: 2 tab(s) orally once a day (in the morning)  metFORMIN 500 mg oral tablet, extended release: 1 tab(s) orally once a day (at bedtime)  Protonix 40 mg oral delayed release tablet: 1 tab(s) orally once a day  valsartan-hydrochlorothiazide 160 mg-12.5 mg oral tablet: 1 tab(s) orally once a day

## 2025-02-28 NOTE — PROGRESS NOTE ADULT - ASSESSMENT
50 year old male patient with hypertension, hyperlipidemia, diabetes mellitus presented to the ED for chest pain over the past 2 weeks.     - Chest pain  Chest pain unlikely cardiac. Most likely 2/2 pleuritis in the setting of recent URI vs GERD; however, given patient has elevated risk factors for CAD, he may benefit from inpatient stress testing to r/o obstructive CAD. No evidence of acute coronary syndrome.    RECOMMENDATIONS:  - will plan for an assessment of patient's exercise capacity and heart function with a treadmill exercise stress test with echo imaging  - continue atorvastatin 20 mg daily  - continue home anti-hypertensives      Note written by visiting resident Antonella Jonas, PGY3. I have made selected edits based upon my assessment. Please see attending note for final recommendations      Jon Hoff (PGY 4)  Cardiology Fellow   50 year old male patient with hypertension, hyperlipidemia, diabetes mellitus presented to the ED for chest pain over the past 2 weeks.     - Chest pain  Chest pain unlikely cardiac. Most likely 2/2 pleuritis in the setting of recent URI vs GERD; however, given patient has elevated risk factors for CAD, he may benefit from inpatient stress testing to r/o obstructive CAD. No evidence of acute coronary syndrome.    RECOMMENDATIONS:  - will plan for an assessment of patient's exercise capacity and heart function with a treadmill exercise stress test with echo imaging  - continue atorvastatin 20 mg daily  - continue home anti-hypertensives    update   exercise stress echo without ischemia  plan for medical therapy as above  ok to dc home and fu with cardiology outpatient     Note written by visiting resident Antonella Jonas, PGY3. I have made selected edits based upon my assessment. Please see attending note for final recommendations      Jon Hoff (PGY 4)  Cardiology Fellow    Patient seen and examined with the fellow, the note above has been edited to reflect my independent history, physical exam, assessment and plan.      Maury Quezada MD, PhD  Cardiology Attending  [facility]/ [facility]    For day time coverage Mon-Fri see Non-Service Consult Attending on [email or web address], password: [email or web address]; daytime weekends covered by general cardiology consult service attending.)

## 2025-02-28 NOTE — DISCHARGE NOTE PROVIDER - CARE PROVIDER_API CALL
Sarah Penaloza.  Internal Medicine  [address] [place] [address]  Phone: [phone]  Fax: [phone]  Established Patient  Follow Up Time: 2 weeks

## 2025-02-28 NOTE — DISCHARGE NOTE NURSING/CASE MANAGEMENT/SOCIAL WORK - FINANCIAL ASSISTANCE
[facility] provides services at a reduced cost to those who are determined to be eligible through [facility]’s financial assistance program. Information regarding [facility]’s financial assistance program can be found by going to [email or web address] or by calling [phone].

## 2025-02-28 NOTE — PROGRESS NOTE ADULT - ASSESSMENT
51 y/o M with PMH HTN, HLD, T2DM p/w intermittent substernal chest pain for the past two weeks c/f unstable angina. 49 y/o M with PMH HTN, HLD, T2DM p/w intermittent substernal chest pain, dyspnea on exertion, and fatigue admitted for ACS rule out/ work up.